# Patient Record
Sex: MALE | Race: WHITE | URBAN - NONMETROPOLITAN AREA
[De-identification: names, ages, dates, MRNs, and addresses within clinical notes are randomized per-mention and may not be internally consistent; named-entity substitution may affect disease eponyms.]

---

## 2017-08-03 ENCOUNTER — OFFICE VISIT - GICH (OUTPATIENT)
Dept: FAMILY MEDICINE | Facility: OTHER | Age: 67
End: 2017-08-03

## 2017-08-03 ENCOUNTER — HISTORY (OUTPATIENT)
Dept: FAMILY MEDICINE | Facility: OTHER | Age: 67
End: 2017-08-03

## 2017-08-03 DIAGNOSIS — E78.2 MIXED HYPERLIPIDEMIA: ICD-10-CM

## 2017-08-03 DIAGNOSIS — I10 ESSENTIAL (PRIMARY) HYPERTENSION: ICD-10-CM

## 2017-08-03 DIAGNOSIS — N40.1 ENLARGED PROSTATE WITH LOWER URINARY TRACT SYMPTOMS (LUTS): ICD-10-CM

## 2017-08-03 DIAGNOSIS — N18.9 CHRONIC KIDNEY DISEASE: ICD-10-CM

## 2017-08-03 DIAGNOSIS — R35.1 NOCTURIA: ICD-10-CM

## 2017-08-03 DIAGNOSIS — E78.5 HYPERLIPIDEMIA: ICD-10-CM

## 2017-08-03 DIAGNOSIS — I25.10 ATHEROSCLEROTIC HEART DISEASE OF NATIVE CORONARY ARTERY WITHOUT ANGINA PECTORIS: ICD-10-CM

## 2017-08-03 DIAGNOSIS — Z95.1 PRESENCE OF AORTOCORONARY BYPASS GRAFT: ICD-10-CM

## 2017-08-03 DIAGNOSIS — Z23 ENCOUNTER FOR IMMUNIZATION: ICD-10-CM

## 2017-08-03 DIAGNOSIS — K21.9 GASTRO-ESOPHAGEAL REFLUX DISEASE WITHOUT ESOPHAGITIS: ICD-10-CM

## 2017-08-03 DIAGNOSIS — E55.9 VITAMIN D DEFICIENCY: ICD-10-CM

## 2017-08-03 LAB
A/G RATIO - HISTORICAL: 1.5 (ref 1–2)
ABSOLUTE BASOPHILS - HISTORICAL: 0 THOU/CU MM
ABSOLUTE EOSINOPHILS - HISTORICAL: 0.3 THOU/CU MM
ABSOLUTE IMMATURE GRANULOCYTES(METAS,MYELOS,PROS) - HISTORICAL: 0 THOU/CU MM
ABSOLUTE LYMPHOCYTES - HISTORICAL: 1.3 THOU/CU MM (ref 0.9–2.9)
ABSOLUTE MONOCYTES - HISTORICAL: 0.5 THOU/CU MM
ABSOLUTE NEUTROPHILS - HISTORICAL: 2.8 THOU/CU MM (ref 1.7–7)
ALBUMIN SERPL-MCNC: 4.1 G/DL (ref 3.5–5.7)
ALP SERPL-CCNC: 40 IU/L (ref 34–104)
ALT (SGPT) - HISTORICAL: 14 IU/L (ref 7–52)
ANION GAP - HISTORICAL: 9 (ref 5–18)
AST SERPL-CCNC: 20 IU/L (ref 13–39)
BASOPHILS # BLD AUTO: 0.6 %
BILIRUB SERPL-MCNC: 0.5 MG/DL (ref 0.3–1)
BILIRUB UR QL: NEGATIVE
BUN SERPL-MCNC: 21 MG/DL (ref 7–25)
BUN/CREAT RATIO - HISTORICAL: 18
CALCIUM SERPL-MCNC: 9.7 MG/DL (ref 8.6–10.3)
CHLORIDE SERPLBLD-SCNC: 106 MMOL/L (ref 98–107)
CHOL/HDL RATIO - HISTORICAL: 4.05
CHOLESTEROL TOTAL: 174 MG/DL
CLARITY, URINE: CLEAR CLARITY
CO2 SERPL-SCNC: 24 MMOL/L (ref 21–31)
COLOR UR: YELLOW COLOR
CREAT SERPL-MCNC: 1.19 MG/DL (ref 0.7–1.3)
EOSINOPHIL NFR BLD AUTO: 6.3 %
ERYTHROCYTE [DISTWIDTH] IN BLOOD BY AUTOMATED COUNT: 12.7 % (ref 11.5–15.5)
GFR IF NOT AFRICAN AMERICAN - HISTORICAL: >60 ML/MIN/1.73M2
GLOBULIN - HISTORICAL: 2.7 G/DL (ref 2–3.7)
GLUCOSE SERPL-MCNC: 101 MG/DL (ref 70–105)
GLUCOSE URINE: NEGATIVE MG/DL
HCT VFR BLD AUTO: 42.9 % (ref 37–53)
HDLC SERPL-MCNC: 43 MG/DL (ref 23–92)
HEMOGLOBIN: 14.5 G/DL (ref 13.5–17.5)
IMMATURE GRANULOCYTES(METAS,MYELOS,PROS) - HISTORICAL: 0.2 %
KETONES UR QL: NEGATIVE MG/DL
LDLC SERPL CALC-MCNC: 94 MG/DL
LEUKOCYTE ESTERASE URINE: NEGATIVE
LYMPHOCYTES NFR BLD AUTO: 26.8 % (ref 20–44)
MCH RBC QN AUTO: 31 PG (ref 26–34)
MCHC RBC AUTO-ENTMCNC: 33.8 G/DL (ref 32–36)
MCV RBC AUTO: 92 FL (ref 80–100)
MONOCYTES NFR BLD AUTO: 9.4 %
NEUTROPHILS NFR BLD AUTO: 56.7 % (ref 42–72)
NITRITE UR QL STRIP: NEGATIVE
NON-HDL CHOLESTEROL - HISTORICAL: 131 MG/DL
OCCULT BLOOD,URINE - HISTORICAL: NEGATIVE
PATIENT STATUS - HISTORICAL: ABNORMAL
PH UR: 5.5 [PH]
PLATELET # BLD AUTO: 196 THOU/CU MM (ref 140–440)
PMV BLD: 10.1 FL (ref 6.5–11)
POTASSIUM SERPL-SCNC: 4.8 MMOL/L (ref 3.5–5.1)
PROT SERPL-MCNC: 6.8 G/DL (ref 6.4–8.9)
PROTEIN QUALITATIVE,URINE - HISTORICAL: NEGATIVE MG/DL
PSA TOTAL (DIAGNOSTIC) - HISTORICAL: 1.36 NG/ML
RED BLOOD COUNT - HISTORICAL: 4.68 MIL/CU MM (ref 4.3–5.9)
SODIUM SERPL-SCNC: 139 MMOL/L (ref 133–143)
SP GR UR STRIP: 1.02
TRIGL SERPL-MCNC: 184 MG/DL
UROBILINOGEN,QUALITATIVE - HISTORICAL: NORMAL EU/DL
VITAMIN D TOTAL - HISTORICAL: 33 NG/ML
WHITE BLOOD COUNT - HISTORICAL: 4.9 THOU/CU MM (ref 4.5–11)

## 2017-09-25 ENCOUNTER — COMMUNICATION - GICH (OUTPATIENT)
Dept: FAMILY MEDICINE | Facility: OTHER | Age: 67
End: 2017-09-25

## 2017-09-25 DIAGNOSIS — K21.9 GASTRO-ESOPHAGEAL REFLUX DISEASE WITHOUT ESOPHAGITIS: ICD-10-CM

## 2017-09-26 ENCOUNTER — COMMUNICATION - GICH (OUTPATIENT)
Dept: FAMILY MEDICINE | Facility: OTHER | Age: 67
End: 2017-09-26

## 2017-09-26 DIAGNOSIS — I25.10 ATHEROSCLEROTIC HEART DISEASE OF NATIVE CORONARY ARTERY WITHOUT ANGINA PECTORIS: ICD-10-CM

## 2017-12-27 NOTE — PROGRESS NOTES
Patient Information     Patient Name MRN Sex Bart Mcintyre [id number] Male 1950      Progress Notes by Mya Herbert MD at 8/3/2017  8:15 AM     Author:  Mya Herbert MD Service:  (none) Author Type:  Physician     Filed:  8/3/2017 12:47 PM Encounter Date:  8/3/2017 Status:  Signed     :  Mya Herbert MD (Physician)            Nursing Notes:   Anjali Wang  8/3/2017  8:55 AM  Signed  Bart Avalos is a 67 y.o. Male here for annual med review/labs. No concerns when asked.  Nadia Wang LPN ...... 8/3/2017 8:26 AM      Subjective:  Bart Avalos is a 67 y.o. male who presents for multiple medical issues     Mixed hyperlipidemia   has been intolerant to statins but now tolerating crestor, most days.   He gets muscle cramps but due to his CABG willcontinue on crestor as 'least offensive one'.  . Takes Omega 3 fish oil is on an aspi      CRF (chronic renal failure), unspecified stage   he has a history of kidney stones.  No new issues.     He drinks lemon mixture daily      Vitamin D deficiency      some muscle cramps on stain.  He is outside more. Working on out building.  Has not been taking supplement.      Nocturia associated with benign prostatic hypertrophy   he gets up twice a night sometimes.  No trouble starting or ending history. Denies sexual dysfunction     HTN (hypertension)   on norvasc, coreg . S/p CABG times 4;    He denies chest pain, palpitations, shortness of breath or dyspnea on exertion.   Denies changes in bowel or bladder or indigestion.   Denies any pedal edema.                     Allergies      Allergen   Reactions     Cerivastatin  Hematuria and Myalgia     weakness      Simvastatin  Myalgia     With higher dose simvastatin but he states it may have been his back issues.  Also did not meet goal in combo with gemfibrozil      Zetia [Ezetimibe]  Rash and Arthralgia     Swelling - face, eyes and nasal   "    Current Outpatient Prescriptions on File Prior to Visit       Medication  Sig Dispense Refill     aspirin (ECOTRIN) 81 mg enteric coated tablet Take 1 tablet by mouth once daily with a meal.  0     ERGOCALCIFEROL, VITAMIN D2, (VITAMIN D2 ORAL) Take  by mouth.       multivitamin (MVI) tablet Take 1 tablet by mouth once daily.       nitroglycerin (NITROSTAT) 0.3 mg sublingual tablet Place 1 tablet under the tongue every 5 minutes if needed for Chest Pain. 1 Bottle 0     omega-3 fatty acids-vitamin E (FISH OIL) 1,000 mg cap Take  by mouth.       No current facility-administered medications on file prior to visit.        Problem List/PMH: reviewed in EMR    Social Hx:  Social History       Substance Use Topics         Smoking status:   Never Smoker     Smokeless tobacco:   Never Used     Alcohol use   3.5 oz/week     7 drink(s) per week        Comment: once a week      Social History Narrative    , retired garbage truck worker , does physical work around the house. Wife is Dulce    Patient has never smoked.     Passive smoke exposure - no    Alcohol Use - no    Regular Exercise - yes    Preload  2012        Family Hx:   Family History       Problem   Relation Age of Onset     Cancer-colon  Father 51     had a colostomy        Heart Disease  Father      stroke  at 61       Cancer-colon  Paternal Grandfather      Other  Father      Stroke  Mother 61     stroke        Cancer-colon  Maternal Uncle      Diabetes  Brother      Diabetes  Brother              Heart Disease  Brother       at 48       Other  Brother      renal failure         Objective:  /72  Pulse 68  Ht 1.753 m (5' 9\")  Wt 85.4 kg (188 lb 3.2 oz)  BMI 27.79 kg/m2   Patient appears well, alert and oriented x 3, pleasant, cooperative.  ESE. TM's clear, Oral pharynx with good dentition, without lesion, erythema or exudate. Moist mucous membranes. Neck supple and free of adenopathy, or masses. No thyromegaly. Lungs are " clear, without wheezes, rhonchi or rales. Heart sounds are normal, no murmurs, clicks, gallops or rubs. Abdomen is soft, no tenderness, masses or organomegaly. No CVAT.  Extremities are without edema. Peripheral pulses are normal. Screening neurological exam is normal without focal findings. Skin is normal without suspicious lesions noted.      Results for orders placed or performed in visit on 08/03/17      LIPID PANEL      Result  Value Ref Range    CHOLESTEROL,TOTAL 174 <200 mg/dL    TRIGLYCERIDES 184 (H) <150 mg/dL    HDL CHOLESTEROL 43 23 - 92 mg/dL    NON-HDL CHOLESTEROL 131 <145 mg/dl    CHOL/HDL RATIO            4.05 <4.50                    LDL CHOLESTEROL 94 <100 mg/dL    PATIENT STATUS            FASTING                   VITAMIN D 25 (DEFICIENCY)      Result  Value Ref Range    VITAMIN D TOTAL AFL 33.0   ng/mL   COMP METABOLIC PANEL      Result  Value Ref Range    SODIUM 139 133 - 143 mmol/L    POTASSIUM 4.8 3.5 - 5.1 mmol/L    CHLORIDE 106 98 - 107 mmol/L    CO2,TOTAL 24 21 - 31 mmol/L    ANION GAP 9 5 - 18                    GLUCOSE 101 70 - 105 mg/dL    CALCIUM 9.7 8.6 - 10.3 mg/dL    BUN 21 7 - 25 mg/dL    CREATININE 1.19 0.70 - 1.30 mg/dL    BUN/CREAT RATIO           18                    GFR if African American >60 >60 ml/min/1.73m2    GFR if not African American >60 >60 ml/min/1.73m2    ALBUMIN 4.1 3.5 - 5.7 g/dL    PROTEIN,TOTAL 6.8 6.4 - 8.9 g/dL    GLOBULIN                  2.7 2.0 - 3.7 g/dL    A/G RATIO 1.5 1.0 - 2.0                    BILIRUBIN,TOTAL 0.5 0.3 - 1.0 mg/dL    ALK PHOSPHATASE 40 34 - 104 IU/L    ALT (SGPT) 14 7 - 52 IU/L    AST (SGOT) 20 13 - 39 IU/L   PSA, TOTAL      Result  Value Ref Range    PSA TOTAL (DIAGNOSTIC) 1.360 <=3.100 ng/mL   URINALYSIS W REFLEX MICROSCOPIC IF POSITIVE      Result  Value Ref Range    COLOR                     Yellow Yellow Color    CLARITY                   Clear Clear Clarity    SPECIFIC GRAVITY,URINE    1.020 1.010, 1.015, 1.020, 1.025                     PH,URINE                  5.5 6.0, 7.0, 8.0, 5.5, 6.5, 7.5, 8.5                    UROBILINOGEN,QUALITATIVE  Normal Normal EU/dl    PROTEIN, URINE Negative Negative mg/dL    GLUCOSE, URINE Negative Negative mg/dL    KETONES,URINE             Negative Negative mg/dL    BILIRUBIN,URINE           Negative Negative                    OCCULT BLOOD,URINE        Negative Negative                    NITRITE                   Negative Negative                    LEUKOCYTE ESTERASE        Negative Negative                   CBC WITH AUTO DIFFERENTIAL      Result  Value Ref Range    WHITE BLOOD COUNT         4.9 4.5 - 11.0 thou/cu mm    RED BLOOD COUNT           4.68 4.30 - 5.90 mil/cu mm    HEMOGLOBIN                14.5 13.5 - 17.5 g/dL    HEMATOCRIT                42.9 37.0 - 53.0 %    MCV                       92 80 - 100 fL    MCH                       31.0 26.0 - 34.0 pg    MCHC                      33.8 32.0 - 36.0 g/dL    RDW                       12.7 11.5 - 15.5 %    PLATELET COUNT            196 140 - 440 thou/cu mm    MPV                       10.1 6.5 - 11.0 fL    NEUTROPHILS               56.7 42.0 - 72.0 %    LYMPHOCYTES               26.8 20.0 - 44.0 %    MONOCYTES                 9.4 <12.0 %    EOSINOPHILS               6.3 <8.0 %    BASOPHILS                 0.6 <3.0 %    IMMATURE GRANULOCYTES(METAS,MYELOS,PROS) 0.2 %    ABSOLUTE NEUTROPHILS      2.8 1.7 - 7.0 thou/cu mm    ABSOLUTE LYMPHOCYTES      1.3 0.9 - 2.9 thou/cu mm    ABSOLUTE MONOCYTES        0.5 <0.9 thou/cu mm    ABSOLUTE EOSINOPHILS      0.3 <0.5 thou/cu mm    ABSOLUTE BASOPHILS        0.0 <0.3 thou/cu mm    ABSOLUTE IMMATURE GRANULOCYTES(METAS,MYELOS,PROS) 0.0 <=0.3 thou/cu mm         Assessment:    ICD-10-CM    1. Mixed hyperlipidemia E78.2    2. CRF (chronic renal failure), unspecified stage N18.9    3. Vitamin D deficiency E55.9 VITAMIN D 25 (DEFICIENCY)      VITAMIN D 25 (DEFICIENCY)   4. Nocturia associated with benign prostatic  hypertrophy N40.1 PSA, TOTAL     R35.1 URINALYSIS W REFLEX MICROSCOPIC IF POSITIVE      PSA, TOTAL      URINALYSIS W REFLEX MICROSCOPIC IF POSITIVE   5. HTN (hypertension) I10 LIPID PANEL      COMP METABOLIC PANEL      CBC AND DIFFERENTIAL      URINALYSIS W REFLEX MICROSCOPIC IF POSITIVE      LIPID PANEL      COMP METABOLIC PANEL      CBC AND DIFFERENTIAL      URINALYSIS W REFLEX MICROSCOPIC IF POSITIVE      CBC WITH AUTO DIFFERENTIAL      amLODIPine (NORVASC) 5 mg tablet      losartan (COZAAR) 50 mg tablet   6. Hyperlipidemia, unspecified hyperlipidemia type E78.5 LIPID PANEL      COMP METABOLIC PANEL      CBC AND DIFFERENTIAL      LIPID PANEL      COMP METABOLIC PANEL      CBC AND DIFFERENTIAL      CBC WITH AUTO DIFFERENTIAL      rosuvastatin (CRESTOR) 40 mg tablet   7. S/P CABG x 4 Z95.1 LIPID PANEL      COMP METABOLIC PANEL      CBC AND DIFFERENTIAL      LIPID PANEL      COMP METABOLIC PANEL      CBC AND DIFFERENTIAL      CBC WITH AUTO DIFFERENTIAL   8. ASHD (arteriosclerotic heart disease) I25.10 carvedilol (COREG) 25 mg tablet   9. Gastroesophageal reflux disease without esophagitis K21.9 omeprazole (PRILOSEC) 20 mg Delayed-Release capsule      MAGNESIUM   10. Need for shingles vaccine Z23 zoster vaccine live, PF, (ZOSTAVAX) 19,400 unit/0.65 mL injection        Mr. Avalos's Body mass index is 27.79 kg/(m^2). This is out of the normal range for a 67 y.o. Normal range for ages 18+ is between 18.5 and 24.9. To lose weight we reviewed risks and benefits of appropriate options such as diet, exercise, and medications. Patient's strategy will be  self-directed nutrition plan and self-directed exercise program      Plan:   -- Expected clinical course discussed   -- Medications and their side effects discussed  Patient Instructions     Car Safety tips:   Wear your seatbelt at all times.   Do not drive when tired.  If drinking alcohol, find a designated .  Do NOT EVER text and drive!   Consider [employer]  for your phone.     Reviewed with patient problems with long term use of PPI including but not limited to change of microbia of gut tammie due to acid changes, interstitialnephritis, decrease calcium and magnesium absorption. c difficile  Black box warning for individuals with Osteoporosis    Trial zantac 75 mg;  One - two tablets twice a day every other day as trying to taper off of omeprazole    take vitamin D 1000 international units daily   add magnesium to labs drawn      .Labs will be mailed to your home.   Work on following  a mediterranean diet; Use monosaturated fats ( olive , canola and peanut   oil; nuts, avocados), abundance of plant foods which are minimally processed, fish (salmon).  Exercise 30 minutes every day     Try to get 7-8 hours sleep each night.  Rest is important!      Please consider the following general health recommendations:     Eat a quality diet (generally, low in simple sugars, starches, cholesterol and saturated fat.)    If your diet contains less than 4 servings of dairy products daily, take 2 calcium 600mg/Vitamin D 200IU tablet twice daily.    Stay physically active.  Regular walking or other exercise is one of the best ways to minimize pain of arthritis; maintain independence and mobility; maintain bone strength; maintain conditioning of your heart.  Find something you enjoy and a friend to do it with you.    Maintain ideal weight.  Your There is no height or weight on file to calculate BMI.. Generally a BMI of 20-25 is considered ideal. Overweight is defined as 25-30, Obese is 30-35 and markedly obese is greater than 35.    Apply sun block (SPF 25 or greater) on exposed skin anytime you are out in the sun to prevent skin cancer.      Wear a seatbelt whenever you are in a car.     Colonoscopy (a scope of the colon) is recommended every 10 years after the age of 50 to screen for colon cancer.  (More often if you are at increased risk.)    Obtain a flu shot every fall.    Receive a  pneumonia shot once after the age of 65 (repeat in 5 years  if you have other risk factors).  This does not prevent all types of pneumonia, but reduces the risk of the worst bacterial cause of pneumonia.    A vaccine to reduce your chances of getting shingles is available if you are over 60. Information about the vaccine is available through the clinic or at:  (http://www.nlm.nih.gov/medlineplus/druginfo/medmaster/a488872.html)    You should have a tetanus booster at least once every 10 years.    Immunization History     Administered  Date(s) Administered     Influenza Virus, Unspecified 01/01/2008     Influenza, IIV3 (Age >=3 years) 12/12/2012, 11/12/2013, 10/07/2014     Pneumococcal Poly,23-Valent (Pneumovax) 11/12/2013     Pneumococcal conj 13-Valent (Prevnar 13) 02/12/2015     Tdap 10/07/2014       Check blood sugar annually.  Check cholesterol annually unless you have had a normal level when last checked within 5 years. (Cholesterol testing is optional as you get older, such as age 75 and above if you don't have heart or circulation problems).     I recommend that you have a general physical exam every year.  Please pick a month that would be most convenient for your yearly schedule. Coming the same month every year makes it easier for you to remember when the exam is due.          Index    Varicella-Zoster Vaccine    [id number] yeison-SEEN  What are other names for this medicine?    Type of medicine: vaccine  Generic and brand names: varicella-zoster vaccine, injection; zoster vaccine live, injection; Zostavax  What is this medicine used for?  This medicine is a vaccine given by injection (a shot) to provide protection against herpes zoster (shingles). This vaccine is approved for people age 50 and older, and recommended for people age 60 and older. You should get the vaccine even if you have had shingles before.  What should my healthcare provider know before I take this medicine?    Before taking  this medicine, tell your healthcare provider if you have ever had:    An allergic reaction to any medicine or to gelatin       A blood disorder, leukemia, lymphoma, or any cancer affecting your bone marrow or lymph system, or if you receive blood transfusions or immune globulin       A weakened immune system from diseases such as HIV or cancer or from taking immunosuppressant medicines to prevent organ transplant rejection or steroid medicines       Tuberculosis   If you are severely ill at the time the shot is scheduled, wait until you recover before getting this vaccine. If you have a mild cold or mild upper respiratory infection with or without fever, you may still be able to get your shot. Talk to your healthcare provider about this.  Females of childbearing age: Talk with your healthcare provider if you are pregnant or plan to become pregnant. It is not known whether this medicine will harm an unborn baby. Do not become pregnant for at least 3 months after receiving this medicine. Do not breast-feed while taking this medicine without your healthcare provider's approval.  How do I use it?    This medicine is given as a single shot by a healthcare provider.  What should I watch out for?    This medicine may cause pain, tenderness, irritation, rash, or swelling on the skin where injections were given. The pain or tenderness should go away in a day or two.  Blood transfusions, immune globulin medicine, or antiviral medicines can change the way this vaccine works. Talk with your healthcare provider about this.  After you receive this vaccine, you may be able to pass the virus to other people. If you get a rash after you receive this vaccine, avoid close contact with pregnant women,  babies, and people whose bodies cannot fight infection (such as those with bone marrow disease, HIV, or people having cancer treatments). Talk with your healthcare provider about this.  If you need emergency care, surgery, or  dental work, tell the healthcare provider or dentist you have received this medicine.  What are the possible side effects?    Along with its needed effects, your medicine may cause some unwanted side effects. Some side effects may be very serious. Some side effects may go away as your body adjusts to the medicine. Tell your healthcare provider if you have any side effects that continue or get worse.  Life-threatening (Report these to your healthcare provider right away. If you cannot reach your healthcare provider right away, get emergency medical care or call 911 for help): Allergic reaction (hives; itching; rash; trouble breathing; tightness in your chest; swelling of your lips, tongue, and throat).  Serious (report these to your healthcare provider right away): High fever that continues or causes seizures; swollen glands; sore throat; severe rash.  Other: Skin irritation or pain where injection is given, mild rash, cough, diarrhea, headache, joint or muscle pain, nausea.  What products might interact with this medicine?    When you take this medicine with other medicines, it can change the way this or any of the other medicines work. Nonprescription medicines, vitamins, natural remedies, and certain foods may also interact. Using these products together might cause harmful side effects. Talk to your healthcare provider if you are taking:    Antiviral medicines such as acyclovir (Zovirax), famciclovir (Famvir), and valacyclovir (Valtrex)       Corticosteroids such as cortisone, dexamethasone, fludrocortisone (Florinef), hydrocortisone (Cortef), methylprednisolone (Medrol), prednisolone (Prelone), prednisone, and triamcinolone (Aristocort)       Immune globulin       Immunosuppressants such as azathioprine (Imuran), basiliximab (Simulect), cyclosporine (Sandimmune, Neoral), glatiramer (Copaxone), mycophenolate (CellCept), sirolimus (Rapamune), and tacrolimus (Prograf)       Medicines used to treat cancer such as  cisplatin, doxorubicin (Adriamycin), hydroxyurea (Hydrea), vinblastine, and vincristine (Vincasar)       Methotrexate (Rheumatrex Dose Pack)       Other vaccines. Tell your healthcare provider about any other immunizations you have had or are scheduled to receive. It is better to get this vaccine at least 4 weeks before or after you get a pneumonia vaccine.       Radiation therapy       Tuberculin skin test (wait at least 4 weeks after receiving this vaccine)   Keep a record of all vaccines received and when you received them.  If you are not sure if your medicines might interact, ask your pharmacist or healthcare provider. Keep a list of all your medicines with you. List all the prescription medicines, nonprescription medicines, supplements, natural remedies, and vitamins that you take. Be sure that you tell all healthcare providers who treat you about all the products you are taking.   This advisory includes selected information only and may not include all side effects of this medicine or interactions with other medicines. Ask your healthcare provider or pharmacist for more information or if you have any questions.  Keep all medicines out of the reach of children.    Do not share medicines with other people.   Developed by [employer].  Medication Advisor 2013.1 published by [employer].  Last modified: 2013-01-30  Last reviewed: 2013-01-30  This content is reviewed periodically and is subject to change as new health information becomes available. The information is intended to inform and educate and is not a replacement for medical evaluation, advice, diagnosis or treatment by a healthcare professional.  References   Medication Advisor 2013.1 Index      2013 [facility] and/or its affiliates. All rights reserved.            Electronically signed by Mya Herbert MD

## 2017-12-28 NOTE — PATIENT INSTRUCTIONS
Patient Information     Patient Name MRN Bart Olguin [id number] Male 1950      Patient Instructions by Mya Herbert MD at 8/3/2017  8:15 AM     Author:  Mya Herbert MD  Service:  (none) Author Type:  Physician     Filed:  8/3/2017 12:47 PM  Encounter Date:  8/3/2017 Status:  Addendum     :  Mya Herbert MD (Physician)        Related Notes: Original Note by Mya Herbert MD (Physician) filed at 8/3/2017  9:01 AM            Car Safety tips:   Wear your seatbelt at all times.   Do not drive when tired.  If drinking alcohol, find a designated .  Do NOT EVER text and drive!   Consider [employer]ley for your phone.     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 c difficile  Black box warning for individuals with Osteoporosis    Trial zantac 75 mg;  One - two tablets twice a day every other day as trying to taper off of omeprazole    take vitamin D 1000 international units daily   add magnesium to labs drawn      .Labs will be mailed to your home.   Work on following  a mediterranean diet; Use monosaturated fats ( olive , canola and peanut   oil; nuts, avocados), abundance of plant foods which are minimally processed, fish (salmon).  Exercise 30 minutes every day     Try to get 7-8 hours sleep each night.  Rest is important!      Please consider the following general health recommendations:     Eat a quality diet (generally, low in simple sugars, starches, cholesterol and saturated fat.)    If your diet contains less than 4 servings of dairy products daily, take 2 calcium 600mg/Vitamin D 200IU tablet twice daily.    Stay physically active.  Regular walking or other exercise is one of the best ways to minimize pain of arthritis; maintain independence and mobility; maintain  bone strength; maintain conditioning of your heart.  Find something you enjoy and a friend to do it with you.    Maintain ideal weight.  Your There is no height or weight on file to calculate BMI.. Generally a BMI of 20-25 is considered ideal. Overweight is defined as 25-30, Obese is 30-35 and markedly obese is greater than 35.    Apply sun block (SPF 25 or greater) on exposed skin anytime you are out in the sun to prevent skin cancer.      Wear a seatbelt whenever you are in a car.     Colonoscopy (a scope of the colon) is recommended every 10 years after the age of 50 to screen for colon cancer.  (More often if you are at increased risk.)    Obtain a flu shot every fall.    Receive a pneumonia shot once after the age of 65 (repeat in 5 years  if you have other risk factors).  This does not prevent all types of pneumonia, but reduces the risk of the worst bacterial cause of pneumonia.    A vaccine to reduce your chances of getting shingles is available if you are over 60. Information about the vaccine is available through the clinic or at:  (http://www.nlm.nih.gov/medlineplus/druginfo/medmaster/p840939.html)    You should have a tetanus booster at least once every 10 years.    Immunization History     Administered  Date(s) Administered     Influenza Virus, Unspecified 01/01/2008     Influenza, IIV3 (Age >=3 years) 12/12/2012, 11/12/2013, 10/07/2014     Pneumococcal Poly,23-Valent (Pneumovax) 11/12/2013     Pneumococcal conj 13-Valent (Prevnar 13) 02/12/2015     Tdap 10/07/2014       Check blood sugar annually.  Check cholesterol annually unless you have had a normal level when last checked within 5 years. (Cholesterol testing is optional as you get older, such as age 75 and above if you don't have heart or circulation problems).     I recommend that you have a general physical exam every year.  Please pick a month that would be most convenient for your yearly schedule. Coming the same month every year makes it  easier for you to remember when the exam is due.          Index    Varicella-Zoster Vaccine    [id number] vak-SEEN  What are other names for this medicine?    Type of medicine: vaccine  Generic and brand names: varicella-zoster vaccine, injection; zoster vaccine live, injection; Zostavax  What is this medicine used for?  This medicine is a vaccine given by injection (a shot) to provide protection against herpes zoster (shingles). This vaccine is approved for people age 50 and older, and recommended for people age 60 and older. You should get the vaccine even if you have had shingles before.  What should my healthcare provider know before I take this medicine?    Before taking this medicine, tell your healthcare provider if you have ever had:    An allergic reaction to any medicine or to gelatin       A blood disorder, leukemia, lymphoma, or any cancer affecting your bone marrow or lymph system, or if you receive blood transfusions or immune globulin       A weakened immune system from diseases such as HIV or cancer or from taking immunosuppressant medicines to prevent organ transplant rejection or steroid medicines       Tuberculosis   If you are severely ill at the time the shot is scheduled, wait until you recover before getting this vaccine. If you have a mild cold or mild upper respiratory infection with or without fever, you may still be able to get your shot. Talk to your healthcare provider about this.  Females of childbearing age: Talk with your healthcare provider if you are pregnant or plan to become pregnant. It is not known whether this medicine will harm an unborn baby. Do not become pregnant for at least 3 months after receiving this medicine. Do not breast-feed while taking this medicine without your healthcare provider's approval.  How do I use it?    This medicine is given as a single shot by a healthcare provider.  What should I watch out for?    This medicine may cause pain, tenderness,  irritation, rash, or swelling on the skin where injections were given. The pain or tenderness should go away in a day or two.  Blood transfusions, immune globulin medicine, or antiviral medicines can change the way this vaccine works. Talk with your healthcare provider about this.  After you receive this vaccine, you may be able to pass the virus to other people. If you get a rash after you receive this vaccine, avoid close contact with pregnant women,  babies, and people whose bodies cannot fight infection (such as those with bone marrow disease, HIV, or people having cancer treatments). Talk with your healthcare provider about this.  If you need emergency care, surgery, or dental work, tell the healthcare provider or dentist you have received this medicine.  What are the possible side effects?    Along with its needed effects, your medicine may cause some unwanted side effects. Some side effects may be very serious. Some side effects may go away as your body adjusts to the medicine. Tell your healthcare provider if you have any side effects that continue or get worse.  Life-threatening (Report these to your healthcare provider right away. If you cannot reach your healthcare provider right away, get emergency medical care or call 911 for help): Allergic reaction (hives; itching; rash; trouble breathing; tightness in your chest; swelling of your lips, tongue, and throat).  Serious (report these to your healthcare provider right away): High fever that continues or causes seizures; swollen glands; sore throat; severe rash.  Other: Skin irritation or pain where injection is given, mild rash, cough, diarrhea, headache, joint or muscle pain, nausea.  What products might interact with this medicine?    When you take this medicine with other medicines, it can change the way this or any of the other medicines work. Nonprescription medicines, vitamins, natural remedies, and certain foods may also interact. Using these  products together might cause harmful side effects. Talk to your healthcare provider if you are taking:    Antiviral medicines such as acyclovir (Zovirax), famciclovir (Famvir), and valacyclovir (Valtrex)       Corticosteroids such as cortisone, dexamethasone, fludrocortisone (Florinef), hydrocortisone (Cortef), methylprednisolone (Medrol), prednisolone (Prelone), prednisone, and triamcinolone (Aristocort)       Immune globulin       Immunosuppressants such as azathioprine (Imuran), basiliximab (Simulect), cyclosporine (Sandimmune, Neoral), glatiramer (Copaxone), mycophenolate (CellCept), sirolimus (Rapamune), and tacrolimus (Prograf)       Medicines used to treat cancer such as cisplatin, doxorubicin (Adriamycin), hydroxyurea (Hydrea), vinblastine, and vincristine (Vincasar)       Methotrexate (Rheumatrex Dose Pack)       Other vaccines. Tell your healthcare provider about any other immunizations you have had or are scheduled to receive. It is better to get this vaccine at least 4 weeks before or after you get a pneumonia vaccine.       Radiation therapy       Tuberculin skin test (wait at least 4 weeks after receiving this vaccine)   Keep a record of all vaccines received and when you received them.  If you are not sure if your medicines might interact, ask your pharmacist or healthcare provider. Keep a list of all your medicines with you. List all the prescription medicines, nonprescription medicines, supplements, natural remedies, and vitamins that you take. Be sure that you tell all healthcare providers who treat you about all the products you are taking.   This advisory includes selected information only and may not include all side effects of this medicine or interactions with other medicines. Ask your healthcare provider or pharmacist for more information or if you have any questions.  Keep all medicines out of the reach of children.    Do not share medicines with other people.   Developed by  [facility].  Medication Advisor 2013.1 published by [facility].  Last modified: 2013-01-30  Last reviewed: 2013-01-30  This content is reviewed periodically and is subject to change as new health information becomes available. The information is intended to inform and educate and is not a replacement for medical evaluation, advice, diagnosis or treatment by a healthcare professional.  References   Medication Advisor 2013.1 Index      2013 [facility] and/or its affiliates. All rights reserved.

## 2017-12-28 NOTE — TELEPHONE ENCOUNTER
Patient Information     Patient Name MRN Bart Olguin [id number] Male 1950      Telephone Encounter by Kayla Licea RN at 2017  1:35 PM     Author:  Kayla Licea RN Service:  (none) Author Type:  NURS- Registered Nurse     Filed:  2017  1:37 PM Encounter Date:  2017 Status:  Signed     :  Kayla Licea RN (NURS- Registered Nurse)            Nitrates/Vasodilators    Office visit in the past 12 months.    Last visit with Mya Herbert MD was on: 2017 in [facility]  Next visit with Mya Herbert MD is on: No future appointment listed with this provider  Next visit with Family Practice is on: No future appointment listed in this department    Max refills 12 months from last office visit.  If any concerns call patient and notify provider.    Prescription refilled per RN Medication Refill Policy.................... Kayla Licea RN ....................  2017   1:37 PM

## 2017-12-28 NOTE — TELEPHONE ENCOUNTER
"Patient Information     Patient Name MRN Bart Olguin [id number] Male 1950      Telephone Encounter by Kayla Licea RN at 2017  1:46 PM     Author:  Kayla Licea RN Service:  (none) Author Type:  NURS- Registered Nurse     Filed:  2017  1:51 PM Encounter Date:  2017 Status:  Signed     :  Kayla Licea RN (NURS- Registered Nurse)            Redundant Refill Request refused;    Medication:omeprazole (PRILOSEC) 20 mg Delayed-Release capsule    Qty:90 capsule  Ref:3  Start:8/3/2017    End:              Route:Oral                  JANE:No   Class:eRx    Sig:Take 1 capsule by mouth once daily before a meal.    Pharmacy:[address]    Per chart review, \"Trial zantac 75 mg;  One - two tablets twice a day every other day as trying to taper off of omeprazole \"    Unable to complete prescription refill per RN Medication Refill Policy.................... Kayla Licea RN ....................  2017   1:46 PM                "

## 2017-12-30 NOTE — NURSING NOTE
Patient Information     Patient Name MRN Bart Olguin [id number] Male 1950      Nursing Note by Anjali Wang at 8/3/2017  8:15 AM     Author:  Anjali Wang Service:  (none) Author Type:  (none)     Filed:  8/3/2017  8:55 AM Encounter Date:  8/3/2017 Status:  Signed     :  Anjali Wang            Bart Avalos is a 67 y.o. Male here for annual med review/labs. No concerns when asked.  Nadia Wang LPN ...... 8/3/2017 8:26 AM

## 2018-01-27 VITALS
WEIGHT: 188.2 LBS | HEART RATE: 68 BPM | SYSTOLIC BLOOD PRESSURE: 144 MMHG | BODY MASS INDEX: 27.88 KG/M2 | HEIGHT: 69 IN | DIASTOLIC BLOOD PRESSURE: 72 MMHG

## 2018-02-12 ENCOUNTER — DOCUMENTATION ONLY (OUTPATIENT)
Dept: FAMILY MEDICINE | Facility: OTHER | Age: 68
End: 2018-02-12

## 2018-02-12 PROBLEM — N20.0 RENAL CALCULUS: Status: ACTIVE | Noted: 2018-02-12

## 2018-02-12 PROBLEM — M48.062 SPINAL STENOSIS, LUMBAR REGION, WITH NEUROGENIC CLAUDICATION: Status: ACTIVE | Noted: 2018-02-12

## 2018-02-12 RX ORDER — ASPIRIN 81 MG/1
81 TABLET ORAL
COMMUNITY
Start: 2015-03-30

## 2018-02-12 RX ORDER — ROSUVASTATIN CALCIUM 40 MG/1
0.5 TABLET, COATED ORAL 2 TIMES DAILY
COMMUNITY
Start: 2017-08-03

## 2018-02-12 RX ORDER — DIPHENOXYLATE HYDROCHLORIDE AND ATROPINE SULFATE 2.5; .025 MG/1; MG/1
1 TABLET ORAL DAILY
COMMUNITY

## 2018-02-12 RX ORDER — AMLODIPINE BESYLATE 5 MG/1
5 TABLET ORAL DAILY
COMMUNITY
Start: 2017-08-03

## 2018-02-12 RX ORDER — CARVEDILOL 25 MG/1
25 TABLET ORAL 2 TIMES DAILY WITH MEALS
COMMUNITY
Start: 2017-08-03

## 2018-02-12 RX ORDER — NITROGLYCERIN 0.3 MG/1
1 TABLET SUBLINGUAL EVERY 5 MIN PRN
COMMUNITY
Start: 2017-09-28

## 2018-02-12 RX ORDER — LOSARTAN POTASSIUM 50 MG/1
50 TABLET ORAL 2 TIMES DAILY
COMMUNITY
Start: 2017-08-03

## 2018-07-24 NOTE — PROGRESS NOTES
Patient Information     Patient Name  Bart Avalos MRN  [id number] Sex  Male   1950      Letter by Mya Herbert MD at      Author:  Mya Herbert MD Service:  (none) Author Type:  (none)    Filed:   Encounter Date:  8/3/2017 Status:  (Other)           Bart Avalos  [address]  [facility] [address]          August 3, 2017    Dear Mr. Avalos:    Following are the tests completed during your last clinic visit.  The results of these tests are normal and require no further attention unless otherwise noted.  While you're vitamin D is normal it is low normal. Consider taking a vitamin D tablet 1000 international units daily as this may help with your  muscle cramps.        Results for orders placed or performed in visit on 17      LIPID PANEL      Result  Value Ref Range    CHOLESTEROL,TOTAL 174 <200 mg/dL    TRIGLYCERIDES 184 (H) <150 mg/dL    HDL CHOLESTEROL 43 23 - 92 mg/dL    NON-HDL CHOLESTEROL 131 <145 mg/dl    CHOL/HDL RATIO            4.05 <4.50                    LDL CHOLESTEROL 94 <100 mg/dL    PATIENT STATUS            FASTING                   VITAMIN D 25 (DEFICIENCY)      Result  Value Ref Range    VITAMIN D TOTAL AFL 33.0   ng/mL   COMP METABOLIC PANEL      Result  Value Ref Range    SODIUM 139 133 - 143 mmol/L    POTASSIUM 4.8 3.5 - 5.1 mmol/L    CHLORIDE 106 98 - 107 mmol/L    CO2,TOTAL 24 21 - 31 mmol/L    ANION GAP 9 5 - 18                    GLUCOSE 101 70 - 105 mg/dL    CALCIUM 9.7 8.6 - 10.3 mg/dL    BUN 21 7 - 25 mg/dL    CREATININE 1.19 0.70 - 1.30 mg/dL    BUN/CREAT RATIO           18                    GFR if African American >60 >60 ml/min/1.73m2    GFR if not African American >60 >60 ml/min/1.73m2    ALBUMIN 4.1 3.5 - 5.7 g/dL    PROTEIN,TOTAL 6.8 6.4 - 8.9 g/dL    GLOBULIN                  2.7 2.0 - 3.7 g/dL    A/G RATIO 1.5 1.0 - 2.0                    BILIRUBIN,TOTAL 0.5 0.3 - 1.0 mg/dL    ALK PHOSPHATASE 40 34 - 104 IU/L    ALT  (SGPT) 14 7 - 52 IU/L    AST (SGOT) 20 13 - 39 IU/L   PSA, TOTAL      Result  Value Ref Range    PSA TOTAL (DIAGNOSTIC) 1.360 <=3.100 ng/mL   URINALYSIS W REFLEX MICROSCOPIC IF POSITIVE      Result  Value Ref Range    COLOR                     Yellow Yellow Color    CLARITY                   Clear Clear Clarity    SPECIFIC GRAVITY,URINE    1.020 1.010, 1.015, 1.020, 1.025                    PH,URINE                  5.5 6.0, 7.0, 8.0, 5.5, 6.5, 7.5, 8.5                    UROBILINOGEN,QUALITATIVE  Normal Normal EU/dl    PROTEIN, URINE Negative Negative mg/dL    GLUCOSE, URINE Negative Negative mg/dL    KETONES,URINE             Negative Negative mg/dL    BILIRUBIN,URINE           Negative Negative                    OCCULT BLOOD,URINE        Negative Negative                    NITRITE                   Negative Negative                    LEUKOCYTE ESTERASE        Negative Negative                   CBC WITH AUTO DIFFERENTIAL      Result  Value Ref Range    WHITE BLOOD COUNT         4.9 4.5 - 11.0 thou/cu mm    RED BLOOD COUNT           4.68 4.30 - 5.90 mil/cu mm    HEMOGLOBIN                14.5 13.5 - 17.5 g/dL    HEMATOCRIT                42.9 37.0 - 53.0 %    MCV                       92 80 - 100 fL    MCH                       31.0 26.0 - 34.0 pg    MCHC                      33.8 32.0 - 36.0 g/dL    RDW                       12.7 11.5 - 15.5 %    PLATELET COUNT            196 140 - 440 thou/cu mm    MPV                       10.1 6.5 - 11.0 fL    NEUTROPHILS               56.7 42.0 - 72.0 %    LYMPHOCYTES               26.8 20.0 - 44.0 %    MONOCYTES                 9.4 <12.0 %    EOSINOPHILS               6.3 <8.0 %    BASOPHILS                 0.6 <3.0 %    IMMATURE GRANULOCYTES(METAS,MYELOS,PROS) 0.2 %    ABSOLUTE NEUTROPHILS      2.8 1.7 - 7.0 thou/cu mm    ABSOLUTE LYMPHOCYTES      1.3 0.9 - 2.9 thou/cu mm    ABSOLUTE MONOCYTES        0.5 <0.9 thou/cu mm    ABSOLUTE EOSINOPHILS      0.3 <0.5 thou/cu mm     ABSOLUTE BASOPHILS        0.0 <0.3 thou/cu mm    ABSOLUTE IMMATURE GRANULOCYTES(METAS,MYELOS,PROS) 0.0 <=0.3 thou/cu mm        If you have any further questions or problems contact my office at the above number.  I trust this finds you in good health and spirits.      Sincerely,         Electronically signed by Mya Herbert MD

## 2018-08-28 RX ORDER — LOSARTAN POTASSIUM 50 MG/1
TABLET ORAL
Qty: 180 TABLET | Refills: 0 | OUTPATIENT
Start: 2018-08-28

## 2018-08-28 NOTE — TELEPHONE ENCOUNTER
LOSARTAN POTASSIUM 50 MG TAB      Called patient regarding medication to let him know he needs to establish care with a new provider. Patient states he sees Dr. Gallagher in [place] now and he will be refilling his medications. Patient states he does not need a refill of this medication. Writer will refuse refill request per patient request.   Milagros Manzanares RN on 8/28/2018 at 12:37 PM

## 2020-02-13 ENCOUNTER — DOCUMENTATION ONLY (OUTPATIENT)
Dept: OTHER | Facility: CLINIC | Age: 70
End: 2020-02-13